# Patient Record
Sex: MALE | URBAN - METROPOLITAN AREA
[De-identification: names, ages, dates, MRNs, and addresses within clinical notes are randomized per-mention and may not be internally consistent; named-entity substitution may affect disease eponyms.]

---

## 2017-01-03 PROBLEM — F34.1 DYSTHYMIA: Status: ACTIVE | Noted: 2017-01-03

## 2017-01-03 NOTE — PATIENT INSTRUCTIONS
Understanding Dysthymia  You know something is wrong. You feel tired and sad most of the time. In fact, you don’t seem to enjoy life much at all anymore. Nothing you do makes you feel better for very long. If this sounds like you, know there is hope.  You © 5581-1608 [address], [address] [place]. All rights reserved. This information is not intended as a substitute for professional medical care. Always follow your healthcare professional's instructions.

## 2017-03-01 DIAGNOSIS — F34.1 DYSTHYMIA: ICD-10-CM

## 2017-03-01 NOTE — TELEPHONE ENCOUNTER
From: Vidhya Carney  To: James Chamorro MD  Sent: 3/1/2017 12:51 AM CST  Subject: Medication Renewal Request    Original authorizing provider: MD Vidhya Sosa would like a refill of the following medications:  escitalopram (LEXAPRO) 20 MG

## 2017-03-06 ENCOUNTER — TELEPHONE (OUTPATIENT)
Dept: INTERNAL MEDICINE CLINIC | Facility: CLINIC | Age: 22
End: 2017-03-06

## 2017-03-06 RX ORDER — ESCITALOPRAM OXALATE 20 MG/1
20 TABLET ORAL DAILY
Qty: 30 TABLET | Refills: 0 | Status: SHIPPED | OUTPATIENT
Start: 2017-03-06 | End: 2017-03-14

## 2017-03-06 NOTE — TELEPHONE ENCOUNTER
Please call patients father about being off of his lexapro until he can come in for an appt next week. Patient is away at college and is feeling the effects of being off of his medication.   Patient would like to get enough medication sent to him at [facility]

## 2017-03-06 NOTE — TELEPHONE ENCOUNTER
Spoke with Dior Landers (Father) and advised that 30 day supply was sent over for Lexapro. However patient to keep follow up appointment for 3/14/2017. He verbalized understanding and agreed with plan.

## 2017-03-14 NOTE — PATIENT INSTRUCTIONS
Understanding Dysthymia  You know something is wrong. You feel tired and sad most of the time. In fact, you don’t seem to enjoy life much at all anymore. Nothing you do makes you feel better for very long. If this sounds like you, know there is hope.  You © 5029-4381 [address], [address] [place]. All rights reserved. This information is not intended as a substitute for professional medical care. Always follow your healthcare professional's instructions.

## 2017-06-13 NOTE — PROGRESS NOTES
HPI:    Patient ID: Nallely Urbano is a 25year old male. HPI  Follow up on dysthymia therapy. . On lexapro 20 mg but getting nausea.  sxs have been well controlled  Review of Systems   Constitutional: Negative for diaphoresis, activity change and appetite by mouth daily. BuPROPion HCl ER, XL, (WELLBUTRIN XL) 150 MG Oral Tablet 24 Hr 90 tablet 0      Sig: Take 1 tablet (150 mg total) by mouth daily.            Imaging & Referrals:  None       [id number]

## 2017-06-13 NOTE — PATIENT INSTRUCTIONS
Understanding Dysthymia  You know something is wrong. You feel tired and sad most of the time. In fact, you don’t seem to enjoy life much at all anymore. Nothing you do makes you feel better for very long. If this sounds like you, know there is hope.  You © 6475-7445 [address], [address] [place]. All rights reserved. This information is not intended as a substitute for professional medical care. Always follow your healthcare professional's instructions.

## 2017-07-31 DIAGNOSIS — F34.1 DYSTHYMIA: ICD-10-CM

## 2017-07-31 RX ORDER — ESCITALOPRAM OXALATE 10 MG/1
10 TABLET ORAL DAILY
Qty: 90 TABLET | Refills: 0 | OUTPATIENT
Start: 2017-07-31

## 2017-07-31 RX ORDER — BUPROPION HYDROCHLORIDE 150 MG/1
150 TABLET ORAL DAILY
Qty: 90 TABLET | Refills: 0 | OUTPATIENT
Start: 2017-07-31

## 2017-07-31 NOTE — TELEPHONE ENCOUNTER
Fax request from [facility] for a refill on Escitalopram tab 10 mg qty 90 refill 4 and Bupropion HCL XL tab 150 mg qty 90 refill 4. Paper in triage.

## 2017-08-03 DIAGNOSIS — F34.1 DYSTHYMIA: ICD-10-CM

## 2017-08-03 RX ORDER — ESCITALOPRAM OXALATE 10 MG/1
10 TABLET ORAL DAILY
Qty: 90 TABLET | Refills: 0 | Status: SHIPPED | OUTPATIENT
Start: 2017-08-03

## 2017-08-03 RX ORDER — BUPROPION HYDROCHLORIDE 150 MG/1
150 TABLET ORAL DAILY
Qty: 90 TABLET | Refills: 0 | Status: SHIPPED | OUTPATIENT
Start: 2017-08-03 | End: 2017-08-07

## 2017-08-03 NOTE — TELEPHONE ENCOUNTER
[facility] received a denied renewal with note \"already sent on 07/13/2017\". Their records indicate they did not receive that RX, and requesting to authorize this RX, sign, date, and fax back form or send new RX electronically. Fax placed in triage.

## 2017-08-07 RX ORDER — BUPROPION HYDROCHLORIDE 150 MG/1
150 TABLET ORAL DAILY
Qty: 90 TABLET | Refills: 0 | Status: SHIPPED | OUTPATIENT
Start: 2017-08-07

## 2019-05-28 ENCOUNTER — MED REC SCAN ONLY (OUTPATIENT)
Dept: INTERNAL MEDICINE CLINIC | Facility: CLINIC | Age: 24
End: 2019-05-28

## (undated) NOTE — MR AVS SNAPSHOT
[address]  [address] [address] [phone]               Thank you for choosing us for your health care visit with Harpal Montez MD.  We are glad to serve you and happy to provide you with this gonzalez · Have two or more other symptoms of depression. How is it treated? Your doctor may recommend counseling, medications, or both. Just talking to a therapist may be a great relief. Your therapist can help you learn how best to cope with problems.  And how t Phone:  [phone]    - BuPROPion HCl ER (XL) 150 MG Tb24  - escitalopram 10 MG Tabs            MyChart     Visit MyChart  You can access your MyChart to more actively manage your health care and view more details from this visit by going to [email or web address]

## (undated) NOTE — MR AVS SNAPSHOT
[address]  [address] [address] [phone]               Thank you for choosing us for your health care visit with Sobia Pena MD.  We are glad to serve you and happy to provide you with this gonzalez may help. So will having the support and caring of those closest to you.   Symptoms of depression  · Gaining or losing a lot of weight  · Sleeping too much or too little  · Feeling tired all the time  · Feeling restless  · Feeling worthless or guilty  · Hav view more details from this visit by going to [email or web address]. If you've recently had a stay at the Hospital you can access your discharge instructions in [facility] by going to Visits < Admission Summaries.  If you've been to the Emergency Depar priority on exercise in your life                    Visit [facility] online at  [email or web address]

## (undated) NOTE — MR AVS SNAPSHOT
[address]  [address] [address] [phone]               Thank you for choosing us for your health care visit with Diya Klein MD.  We are glad to serve you and happy to provide you with this gonzalez life. Certain medications may also be an option. These can help you feel less depressed. Eating a healthy diet and getting plenty of exercise also may help. So will having the support and caring of those closest to you.   Symptoms of depression  · Gaining o office, you can view your past visit information in [facility] by going to Visits < Visit Summaries. [facility] questions? Call [phone] for help. [facility] is NOT to be used for urgent needs. For medical emergencies, dial 911.            Visit [facility]